# Patient Record
Sex: MALE | Race: WHITE | NOT HISPANIC OR LATINO | Employment: FULL TIME | URBAN - METROPOLITAN AREA
[De-identification: names, ages, dates, MRNs, and addresses within clinical notes are randomized per-mention and may not be internally consistent; named-entity substitution may affect disease eponyms.]

---

## 2020-09-18 ENCOUNTER — HOSPITAL ENCOUNTER (EMERGENCY)
Facility: HOSPITAL | Age: 55
Discharge: HOME | End: 2020-09-18
Attending: EMERGENCY MEDICINE
Payer: OTHER MISCELLANEOUS

## 2020-09-18 VITALS
OXYGEN SATURATION: 97 % | HEIGHT: 71 IN | TEMPERATURE: 98 F | SYSTOLIC BLOOD PRESSURE: 103 MMHG | HEART RATE: 86 BPM | BODY MASS INDEX: 44.1 KG/M2 | RESPIRATION RATE: 18 BRPM | WEIGHT: 315 LBS | DIASTOLIC BLOOD PRESSURE: 56 MMHG

## 2020-09-18 DIAGNOSIS — S82.832A CLOSED FRACTURE OF PROXIMAL END OF LEFT FIBULA, UNSPECIFIED FRACTURE MORPHOLOGY, INITIAL ENCOUNTER: Primary | ICD-10-CM
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
## 2020-09-18 PROCEDURE — 29515 APPLICATION SHORT LEG SPLINT: CPT | Mod: LT

## 2020-09-18 PROCEDURE — 99284 EMERGENCY DEPT VISIT MOD MDM: CPT | Mod: 25

## 2020-09-18 PROCEDURE — 90471 IMMUNIZATION ADMIN: CPT | Performed by: EMERGENCY MEDICINE

## 2020-09-18 PROCEDURE — 63600175 PHARM REV CODE 636 W HCPCS: Performed by: EMERGENCY MEDICINE

## 2020-09-18 PROCEDURE — 90715 TDAP VACCINE 7 YRS/> IM: CPT | Performed by: EMERGENCY MEDICINE

## 2020-09-18 PROCEDURE — 25000003 PHARM REV CODE 250: Performed by: EMERGENCY MEDICINE

## 2020-09-18 RX ORDER — OXYCODONE AND ACETAMINOPHEN 5; 325 MG/1; MG/1
1 TABLET ORAL EVERY 4 HOURS PRN
Qty: 18 TABLET | Refills: 0 | Status: SHIPPED | OUTPATIENT
Start: 2020-09-18

## 2020-09-18 RX ORDER — OXYCODONE AND ACETAMINOPHEN 5; 325 MG/1; MG/1
2 TABLET ORAL
Status: COMPLETED | OUTPATIENT
Start: 2020-09-18 | End: 2020-09-18

## 2020-09-18 RX ADMIN — OXYCODONE AND ACETAMINOPHEN 2 TABLET: 5; 325 TABLET ORAL at 09:09

## 2020-09-18 RX ADMIN — CLOSTRIDIUM TETANI TOXOID ANTIGEN (FORMALDEHYDE INACTIVATED), CORYNEBACTERIUM DIPHTHERIAE TOXOID ANTIGEN (FORMALDEHYDE INACTIVATED), BORDETELLA PERTUSSIS TOXOID ANTIGEN (GLUTARALDEHYDE INACTIVATED), BORDETELLA PERTUSSIS FILAMENTOUS HEMAGGLUTININ ANTIGEN (FORMALDEHYDE INACTIVATED), BORDETELLA PERTUSSIS PERTACTIN ANTIGEN, AND BORDETELLA PERTUSSIS FIMBRIAE 2/3 ANTIGEN 0.5 ML: 5; 2; 2.5; 5; 3; 5 INJECTION, SUSPENSION INTRAMUSCULAR at 09:09

## 2020-09-18 NOTE — DISCHARGE INSTRUCTIONS
Use crutches.  No weight-bearing.  Neurovascular checks the toes.  Ice pack to the affected area for 48 hours.  Percocet for pain as directed.

## 2020-09-18 NOTE — ED PROVIDER NOTES
Encounter Date: 9/18/2020       History     Chief Complaint   Patient presents with    Ankle Pain     L ankle/ calf pain r/t injury at work     55-year-old male presents emergency room with a history that he lost his step as a was climbing on machinery at work and sustained an injury to his left lower leg.  He complains of pain to his ankle as well as to his left calf.  The patient did sustain an abrasion to his tibial tubercle area.  Last tetanus is unknown.  He has no complaints of any numbness to his forefoot or toes.  No complaints of any pain proximal to the knee.  No other injuries otherwise voiced.        Review of patient's allergies indicates:   Allergen Reactions    Pcn [penicillins]      No past medical history on file.  No past surgical history on file.  No family history on file.  Social History     Tobacco Use    Smoking status: Not on file   Substance Use Topics    Alcohol use: Not on file    Drug use: Not on file     Review of Systems   Constitutional: Positive for activity change.   HENT: Negative for congestion.    Musculoskeletal: Positive for arthralgias and gait problem. Negative for neck pain.        Patella tendon is intact   Skin: Negative for pallor, rash and wound.        Abrasion overlying the area of the tibial tubercle   Neurological: Negative for dizziness.   All other systems reviewed and are negative.      Physical Exam     Initial Vitals [09/18/20 0902]   BP Pulse Resp Temp SpO2   (!) 103/56 86 18 97.6 °F (36.4 °C) 97 %      MAP       --         Physical Exam    Constitutional: He appears well-developed and well-nourished. He is not diaphoretic. No distress.   HENT:   Head: Normocephalic and atraumatic.   Musculoskeletal: Tenderness present. No edema.      Comments: Tender over the proximal lateral aspect of left calf.  The patella tendons intact.  Peripheral pulses are intact.  No evidence of any footdrop.  Patient is able to extend toes without problems.  There is some  bimalleolar ankle tenderness.  Achilles is intact.  No calcaneal tenderness.  Metatarsals are nontender as well as toes.  Sensation is intact   Neurological: He is alert and oriented to person, place, and time. GCS score is 15. GCS eye subscore is 4. GCS verbal subscore is 5. GCS motor subscore is 6.   Skin: Skin is warm and dry. Capillary refill takes less than 2 seconds. No rash noted. No erythema. No pallor.   Psychiatric: He has a normal mood and affect. His behavior is normal. Judgment and thought content normal.         ED Course   Procedures  Labs Reviewed - No data to display       Imaging Results          X-Ray Tibia Fibula 2 View Left (Final result)  Result time 09/18/20 09:34:42    Final result by Marshal Parkinson MD (09/18/20 09:34:42)                 Narrative:    REASON: Ankle pain.    TECHNIQUE: AP and lateral radiographs of the left tibia and fibula.    COMPARISON: None.    FINDINGS:    Minimally displaced acute spiral fracture of the proximal  metadiaphysis of the fibula noted. No gross soft tissue abnormality.    IMPRESSION:    Minimally displaced acute spiral fracture of the proximal  metadiaphysis of the fibula.    Electronically Signed by Marshal Parkinson on 9/18/2020 9:40 AM                             X-Ray Ankle Complete Left (Final result)  Result time 09/18/20 09:34:42    Final result by Marshal Parkinson MD (09/18/20 09:34:42)                 Narrative:    REASON: Ankle pain.    TECHNIQUE: 3 radiographic views of the left ankle    COMPARISON: None.    FINDINGS:    No acute fracture or dislocation identified. The ankle joint space is  preserved. Mineral densities are noted at the medial malleolus, and  are felt to reflect osteophytes. Osteophytes at the lateral malleolus  also noted. There is mild osteophytosis of the tibial plafond. Mild  Achilles and plantar calcaneal enthesopathy. Mild midfoot dorsal  osteophytosis. There is moderate soft tissue swelling about the ankle.    IMPRESSION:    1.   Moderate soft tissue swelling about the ankle with no acute  osseous abnormality.  2.  Multifocal degenerative changes as described.    Electronically Signed by Marshal Iggy on 9/18/2020 9:38 AM                                            Attending Attestation:             Attending ED Notes:   This patient who had an accidental fall, has sustained a proximal fibular fracture which is nondisplaced.  The ankle is intact.  Patient will be placed in a short-leg splint and crutches.  He is given 10 mg Percocet in the ED and will be discharged on analgesics.  He is to follow up with Orthopedic surgery.  He is instructed to non weightbear.  Ice pack to the affected area for 48 hours.  Continue Percocet if needed for pain.  Neurovascular checks the toes.                    Clinical Impression:       ICD-10-CM ICD-9-CM   1. Closed fracture of proximal end of left fibula, unspecified fracture morphology, initial encounter  S82.832A 823.01   2. Fall  W19.XXXA E888.9                          ED Disposition Condition    Discharge Stable        ED Prescriptions     Medication Sig Dispense Start Date End Date Auth. Provider    oxyCODONE-acetaminophen (PERCOCET) 5-325 mg per tablet Take 1 tablet by mouth every 4 (four) hours as needed for Pain. 18 tablet 9/18/2020  Ramakrishna Persaud Jr., MD        Follow-up Information     Follow up With Specialties Details Why Contact Info    Silviano Catalan MD Orthopedic Surgery Schedule an appointment as soon as possible for a visit  For further evaluation [address] [facility]  [facility] [address]  [phone]                                         Ramakrishna Persaud Jr., MD  09/18/20 [id number]

## 2024-12-01 PROBLEM — R06.02 SOB (SHORTNESS OF BREATH): Status: ACTIVE | Noted: 2024-12-01

## 2024-12-01 PROBLEM — E66.01 SEVERE OBESITY (BMI >= 40): Status: ACTIVE | Noted: 2024-12-01

## 2024-12-01 PROBLEM — S20.211D: Status: ACTIVE | Noted: 2024-12-01

## 2024-12-01 PROBLEM — S22.41XA CLOSED FRACTURE OF MULTIPLE RIBS OF RIGHT SIDE: Status: ACTIVE | Noted: 2024-12-01

## 2024-12-01 PROBLEM — J90 PLEURAL EFFUSION ON RIGHT: Status: ACTIVE | Noted: 2024-12-01

## 2024-12-02 PROBLEM — G47.33 OSA ON CPAP: Status: ACTIVE | Noted: 2024-12-02

## 2024-12-02 PROBLEM — R06.02 SOB (SHORTNESS OF BREATH): Status: ACTIVE | Noted: 2024-12-02

## 2025-05-08 ENCOUNTER — TELEPHONE (OUTPATIENT)
Dept: CARDIOLOGY | Facility: CLINIC | Age: 60
End: 2025-05-08
Payer: COMMERCIAL

## 2025-05-08 NOTE — TELEPHONE ENCOUNTER
----- Message from Nabeel sent at 5/8/2025  9:56 AM CDT -----  Contact: Pt [phone]  Type:  Needs Medical AdviceWho Called: PtWould the patient rather a call back or a response via [facility]? [facility] Call Back Number: [phone] Additional Information: Pt wanted to adv provider that his calcium score was 600, and he is worried about it being that high. Pls call back and adv. Thank you.